# Patient Record
Sex: MALE | Race: WHITE | NOT HISPANIC OR LATINO | URBAN - METROPOLITAN AREA
[De-identification: names, ages, dates, MRNs, and addresses within clinical notes are randomized per-mention and may not be internally consistent; named-entity substitution may affect disease eponyms.]

---

## 2024-12-22 ENCOUNTER — OFFICE VISIT (OUTPATIENT)
Dept: URGENT CARE | Age: 29
End: 2024-12-22

## 2024-12-22 VITALS
HEART RATE: 88 BPM | WEIGHT: 190 LBS | SYSTOLIC BLOOD PRESSURE: 119 MMHG | TEMPERATURE: 98.3 F | DIASTOLIC BLOOD PRESSURE: 70 MMHG | RESPIRATION RATE: 17 BRPM | BODY MASS INDEX: 25.18 KG/M2 | OXYGEN SATURATION: 97 % | HEIGHT: 73 IN

## 2024-12-22 DIAGNOSIS — L02.413 ABSCESS OF RIGHT FOREARM: Primary | ICD-10-CM

## 2024-12-22 PROCEDURE — 99203 OFFICE O/P NEW LOW 30 MIN: CPT | Performed by: PHYSICIAN ASSISTANT

## 2024-12-22 PROCEDURE — 3008F BODY MASS INDEX DOCD: CPT | Performed by: PHYSICIAN ASSISTANT

## 2024-12-22 RX ORDER — DOXYCYCLINE 100 MG/1
100 CAPSULE ORAL 2 TIMES DAILY
Qty: 20 CAPSULE | Refills: 0 | Status: SHIPPED | OUTPATIENT
Start: 2024-12-22 | End: 2025-01-01

## 2024-12-22 ASSESSMENT — ENCOUNTER SYMPTOMS
COLOR CHANGE: 1
FEVER: 0
WOUND: 1
CHILLS: 1
ROS SKIN COMMENTS: SEE HPI

## 2024-12-22 NOTE — PATIENT INSTRUCTIONS
"Skin abscess  What is a skin abscess? -- A skin abscess is a painful bump that forms when pus collects under the skin (picture 1). It looks similar to a pimple, but is usually much larger. Skin abscesses most often form when there is cut or nick in the skin that allows bacteria from the skin's surface to get in. Sometimes, an ingrown hair can cause a skin abscess to form.  The bacterial infection causes the skin to become swollen and painful. It also makes the skin look red or darker in color. Pus forms as the body tries to fight off the bacteria.  Less often, a skin abscess might be caused by another type of germ, like a virus, fungus, or parasite.    What are the symptoms of a skin abscess? -- Symptoms might include:  -A large bump that looks like a pimple - The bump might drain fluid or pus.  -Swollen or red skin around the abscess  -Pain, especially when touched  -In some cases, a skin abscess might also cause fever or chills. But this is uncommon.  Skin abscesses can form anywhere on the body. But they are most common on the arms, legs, back, chest, and belly.    How is a skin abscess treated? -- With most abscesses, a doctor will make a small cut on the surface of the abscess to drain out the pus. For small abscesses that are draining pus on their own, cutting into the abscess might not be needed. \"Small\" usually means less than 3/4 inch (2 cm) in size.  In most cases, your doctor or nurse will also prescribe an antibiotic medicine. Antibiotics help kill the bacteria that caused the abscess and keep the skin from getting infected again. If you get antibiotics, finish all of the medicine and take it exactly as instructed. Never skip doses or stop taking the medicine without talking to your doctor or nurse.    Is there anything I can do on my own to feel better? -- Yes. To relieve symptoms and help your skin abscess drain, you can put a warm, wet compress on the area:  -Wet a clean washcloth with warm water, and " put it over your abscess.  -When the washcloth cools, reheat it with warm water and put it back over the abscess.  -Repeat these steps for about 15 minutes, and try to do this 4 times a day.  -Do not try to squeeze or pop an abscess. This can make it worse.    Go to the ER if:   -Your abscess is getting bigger.  -You have signs that your infection is getting worse - These include a fever of 100.4°F (38°C) or higher, or more redness around the abscess.

## 2024-12-22 NOTE — PROGRESS NOTES
"Subjective   Patient ID: Christiano Kay is a 29 y.o. male. They present today with a chief complaint of Other (Right arm wound).    History of Present Illness  -c/o abscess on his right forearm which started about 3 days ago  -he \"popped\" the abscess with his fingers and it drained hard yellow material  -today there is increased swelling and redness of his arm   -endorses chills without fever   -he has history of eczema on his b/l hands which happens every winter due to dry skin  -denies history of recurrent abscesses  -denies history of IV drug use  -denies allergies to antibiotics           Past Medical History  Allergies as of 12/22/2024    (No Known Allergies)       (Not in a hospital admission)       No past medical history on file.    No past surgical history on file.         Review of Systems  Review of Systems   Constitutional:  Positive for chills. Negative for fever.   Skin:  Positive for color change, rash and wound.        SEE HPI   All other systems reviewed and are negative.      Objective    Vitals:    12/22/24 1424   BP: 119/70   Pulse: 88   Resp: 17   Temp: 36.8 °C (98.3 °F)   TempSrc: Oral   SpO2: 97%   Weight: 86.2 kg (190 lb)   Height: 1.854 m (6' 1\")     No LMP for male patient.    Physical Exam  Vitals reviewed.   Constitutional:       General: He is not in acute distress.     Appearance: Normal appearance. He is not ill-appearing, toxic-appearing or diaphoretic.   HENT:      Head: Normocephalic and atraumatic.   Skin:     Findings: Abscess and rash present. Rash is papular.             Comments: -b/l arms and hands with dry papular rash consistent with eczema    Neurological:      Mental Status: He is alert.       /70   Pulse 88   Temp 36.8 °C (98.3 °F) (Oral)   Resp 17   Ht 1.854 m (6' 1\")   Wt 86.2 kg (190 lb)   SpO2 97%   BMI 25.07 kg/m²       Procedures    Point of Care Test & Imaging Results from this visit  No results found for this visit on 12/22/24.   No results " found.    Diagnostic study results (if any) were reviewed by Ericka Mendez PA-C.    Assessment/Plan   Allergies, medications, history, and pertinent labs/EKGs/Imaging reviewed by Ericka Mendez PA-C.     Medical Decision Making  Clinical presentation consistent with abscess and cellulitis of the right forearm. There is a 2mm x 2mm indurated area on the right forearm with 1mm x 1mm eschar.  There is no fluctuance to suggest the lesion would be further amenable to drainage at this time.  Currently patient is HDS without evidence of sepsis or systemic infection.   Will start on doxycycline.  Advised to do warm compresses in additional to the antibiotics.  Risks, benefits, and alternatives of the medications and treatment plan prescribed today were discussed, and patient expressed understanding. Plan follow up as discussed or as needed if any worsening symptoms or change in condition. Reinforced red flags including (but not limited to): severe or worsening pain; difficulty swallowing; stiff neck; shortness of breath; coughing or vomiting blood; chest pain; and new or increased fever are indications to go to the Emergency Department.  Patient was also given referral to PCP.    At time of discharge patient was clinically well-appearing and HDS for outpatient management. The patient and/or family was educated regarding diagnosis, supportive care, OTC and Rx medications. The patient and/or family was given the opportunity to ask questions prior to discharge.  They verbalized understanding of my discussion of the plans for treatment, expected course, indications to return to  or seek further evaluation in ED, and the need for timely follow up as directed.   They were provided with a work/school excuse if requested.  AVS provided to patient.  All questions were answered and the patient verbalized understanding of the plan of care for today.      Orders and Diagnoses  Diagnoses and all orders for this visit:  Abscess of  right forearm  -     doxycycline (Vibramycin) 100 mg capsule; Take 1 capsule (100 mg) by mouth 2 times a day for 10 days. Take with at least 8 ounces (large glass) of water, do not lie down for 30 minutes after  -     Referral to Primary Care; Future      Medical Admin Record      Patient disposition: Home    Electronically signed by Ericka Mendez PA-C  3:28 PM